# Patient Record
Sex: MALE | Race: WHITE
[De-identification: names, ages, dates, MRNs, and addresses within clinical notes are randomized per-mention and may not be internally consistent; named-entity substitution may affect disease eponyms.]

---
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
## 2023-08-08 ENCOUNTER — HOSPITAL ENCOUNTER (EMERGENCY)
Dept: HOSPITAL 41 - JD.ED | Age: 23
Discharge: HOME | End: 2023-08-08
Payer: COMMERCIAL

## 2023-08-08 DIAGNOSIS — Z88.1: ICD-10-CM

## 2023-08-08 DIAGNOSIS — F17.210: ICD-10-CM

## 2023-08-08 DIAGNOSIS — Z88.8: ICD-10-CM

## 2023-08-08 DIAGNOSIS — K21.9: Primary | ICD-10-CM

## 2023-08-08 LAB
ALBUMIN SERPL-MCNC: 4.3 G/DL (ref 3.4–5)
ALBUMIN/GLOB SERPL: 1.3 {RATIO} (ref 1–2)
ALP SERPL-CCNC: 68 U/L (ref 46–116)
ALT SERPL-CCNC: 26 U/L (ref 16–63)
ANION GAP SERPL CALC-SCNC: 13.8 MMOL/L (ref 5–15)
AST SERPL-CCNC: 20 U/L (ref 15–37)
BASOPHILS # BLD AUTO: 0.1 K/MM3 (ref 0.01–0.08)
BASOPHILS NFR BLD AUTO: 1.2 % (ref 0.1–1.2)
BILIRUB SERPL-MCNC: 0.3 MG/DL (ref 0.2–1)
BUN SERPL-MCNC: 20 MG/DL (ref 7–18)
BUN/CREAT SERPL: 25 (ref 14–18)
CALCIUM SERPL-MCNC: 9.2 MG/DL (ref 8.5–10.1)
CHLORIDE SERPL-SCNC: 102 MEQ/L (ref 98–107)
CO2 SERPL-SCNC: 25 MEQ/L (ref 21–32)
CREAT CL 24H UR+SERPL-VRATE: 147.29 ML/MIN
CREAT SERPL-MCNC: 0.8 MG/DL (ref 0.7–1.3)
EGFRCR SERPLBLD CKD-EPI 2021: 130 ML/MIN (ref 60–?)
EOSINOPHIL # BLD AUTO: 1.15 K/MM3 (ref 0.04–0.54)
EOSINOPHIL NFR BLD AUTO: 14.2 % (ref 0.8–7)
GLOBULIN SER-MCNC: 3.4 GM/DL
GLUCOSE SERPL-MCNC: 88 MG/DL (ref 70–99)
HCT VFR BLD AUTO: 42.7 % (ref 40.1–51)
HGB BLD-MCNC: 14.5 GM/DL (ref 13.7–17.5)
IMM GRANULOCYTES # BLD: 0.01 K/MM3 (ref 0–0.1)
IMM GRANULOCYTES NFR BLD: 0.1 % (ref ?–1)
LYMPHOCYTES # BLD AUTO: 2.14 K/MM3 (ref 1.32–3.57)
LYMPHOCYTES NFR BLD AUTO: 26.5 % (ref 21.8–53.1)
MCH RBC QN AUTO: 29.7 PG (ref 25.7–32.2)
MCHC RBC AUTO-ENTMCNC: 34 G/DL (ref 32.2–35.5)
MCHC RBC AUTO-ENTMCNC: 87.5 FL (ref 79–92.2)
MONOCYTES # BLD AUTO: 0.54 K/MM3 (ref 0.3–0.82)
MONOCYTES NFR BLD AUTO: 6.7 % (ref 5.3–12.2)
NEUTROPHILS # BLD AUTO: 4.14 K/MM3 (ref 1.78–5.38)
NEUTROPHILS NFR BLD AUTO: 51.3 % (ref 34–67.9)
PLATELET # BLD AUTO: 264 K/MM3 (ref 163–337)
PMV BLD AUTO: 11.1 FL (ref 9.4–12.3)
POTASSIUM SERPL-SCNC: 3.8 MEQ/L (ref 3.5–5.1)
PROT SERPL-MCNC: 7.7 G/DL (ref 6.4–8.2)
RBC # BLD AUTO: 4.88 M/MM3 (ref 4.63–6.08)
SODIUM SERPL-SCNC: 137 MEQ/L (ref 136–145)
TROPONIN I SERPL HS-IMP: 7 PG/ML (ref ?–76)
WBC # BLD AUTO: 8.08 K/MM3 (ref 4.23–9.07)

## 2023-08-08 PROCEDURE — 84484 ASSAY OF TROPONIN QUANT: CPT

## 2023-08-08 PROCEDURE — 85025 COMPLETE CBC W/AUTO DIFF WBC: CPT

## 2023-08-08 PROCEDURE — 71046 X-RAY EXAM CHEST 2 VIEWS: CPT

## 2023-08-08 PROCEDURE — 99285 EMERGENCY DEPT VISIT HI MDM: CPT

## 2023-08-08 PROCEDURE — 36415 COLL VENOUS BLD VENIPUNCTURE: CPT

## 2023-08-08 PROCEDURE — 80053 COMPREHEN METABOLIC PANEL: CPT

## 2023-08-08 PROCEDURE — 93005 ELECTROCARDIOGRAM TRACING: CPT

## 2023-08-08 PROCEDURE — 85379 FIBRIN DEGRADATION QUANT: CPT

## 2024-12-15 ENCOUNTER — HOSPITAL ENCOUNTER (EMERGENCY)
Dept: HOSPITAL 41 - JD.ED | Age: 24
Discharge: HOME | End: 2024-12-15
Payer: COMMERCIAL
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
## 2024-12-15 DIAGNOSIS — Z23: ICD-10-CM

## 2024-12-15 DIAGNOSIS — Z88.8: ICD-10-CM

## 2024-12-15 DIAGNOSIS — J45.909: ICD-10-CM
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
## 2024-12-15 DIAGNOSIS — S90.412A: Primary | ICD-10-CM

## 2024-12-15 RX ADMIN — TETANUS TOXOID, REDUCED DIPHTHERIA TOXOID AND ACELLULAR PERTUSSIS VACCINE, ADSORBED ONE ML: 5; 2.5; 8; 8; 2.5 SUSPENSION INTRAMUSCULAR at 19:43
